# Patient Record
Sex: FEMALE | URBAN - METROPOLITAN AREA
[De-identification: names, ages, dates, MRNs, and addresses within clinical notes are randomized per-mention and may not be internally consistent; named-entity substitution may affect disease eponyms.]

---

## 2022-01-06 ENCOUNTER — TELEPHONE (OUTPATIENT)
Dept: RHEUMATOLOGY | Age: 22
End: 2022-01-06

## 2022-01-06 NOTE — TELEPHONE ENCOUNTER
----- Message from Richardson Mejia MD sent at 1/6/2022 12:37 PM EST -----  Please send dismissal letter.

## 2022-01-06 NOTE — LETTER
[facility]  [facility] [address]  Phone: [phone]  Fax: [phone]      January 6, 2022     Robert Fails  2000  [address] [facility] [address], [address]      Dear Irma Long,    I regret to inform you that you have been dismissed from [facility] for no showing to your new patient visit with Dr Lynda Fink on 1/6/2022. I urge you to see a new provider quickly so that there will be no interruption of your care. I wish you the best in your future medical care. You may contact your insurance provider, managed care organization (if applicable) or local medical Society to obtain a current listing of providers available to provide care.      Sincerely,        Berenice Crisostomo MD